# Patient Record
Sex: FEMALE
[De-identification: names, ages, dates, MRNs, and addresses within clinical notes are randomized per-mention and may not be internally consistent; named-entity substitution may affect disease eponyms.]

---

## 2020-12-07 PROBLEM — Z00.00 ENCOUNTER FOR PREVENTIVE HEALTH EXAMINATION: Status: ACTIVE | Noted: 2020-12-07

## 2020-12-11 ENCOUNTER — APPOINTMENT (OUTPATIENT)
Dept: HEART AND VASCULAR | Facility: CLINIC | Age: 50
End: 2020-12-11
Payer: COMMERCIAL

## 2020-12-11 ENCOUNTER — APPOINTMENT (OUTPATIENT)
Dept: HEART AND VASCULAR | Facility: CLINIC | Age: 50
End: 2020-12-11

## 2020-12-11 VITALS
SYSTOLIC BLOOD PRESSURE: 120 MMHG | DIASTOLIC BLOOD PRESSURE: 84 MMHG | TEMPERATURE: 98.2 F | HEIGHT: 65 IN | HEART RATE: 105 BPM | BODY MASS INDEX: 35.32 KG/M2 | WEIGHT: 212 LBS | OXYGEN SATURATION: 97 %

## 2020-12-11 DIAGNOSIS — R06.02 SHORTNESS OF BREATH: ICD-10-CM

## 2020-12-11 DIAGNOSIS — Z78.9 OTHER SPECIFIED HEALTH STATUS: ICD-10-CM

## 2020-12-11 DIAGNOSIS — R94.31 ABNORMAL ELECTROCARDIOGRAM [ECG] [EKG]: ICD-10-CM

## 2020-12-11 DIAGNOSIS — Z84.1 FAMILY HISTORY OF DISORDERS OF KIDNEY AND URETER: ICD-10-CM

## 2020-12-11 PROCEDURE — 93306 TTE W/DOPPLER COMPLETE: CPT

## 2020-12-11 PROCEDURE — 99204 OFFICE O/P NEW MOD 45 MIN: CPT

## 2020-12-11 PROCEDURE — 99072 ADDL SUPL MATRL&STAF TM PHE: CPT

## 2020-12-11 NOTE — PHYSICAL EXAM
[General Appearance - Well Developed] : well developed [Normal Appearance] : normal appearance [Well Groomed] : well groomed [General Appearance - Well Nourished] : well nourished [No Deformities] : no deformities [General Appearance - In No Acute Distress] : no acute distress [Normal Conjunctiva] : the conjunctiva exhibited no abnormalities [Eyelids - No Xanthelasma] : the eyelids demonstrated no xanthelasmas [Normal Oral Mucosa] : normal oral mucosa [No Oral Pallor] : no oral pallor [No Oral Cyanosis] : no oral cyanosis [Normal Jugular Venous A Waves Present] : normal jugular venous A waves present [Normal Jugular Venous V Waves Present] : normal jugular venous V waves present [No Jugular Venous Rollins A Waves] : no jugular venous rollins A waves [Respiration, Rhythm And Depth] : normal respiratory rhythm and effort [Exaggerated Use Of Accessory Muscles For Inspiration] : no accessory muscle use [Auscultation Breath Sounds / Voice Sounds] : lungs were clear to auscultation bilaterally [Heart Rate And Rhythm] : heart rate and rhythm were normal [Heart Sounds] : normal S1 and S2 [Murmurs] : no murmurs present [Abdomen Soft] : soft [Abdomen Tenderness] : non-tender [Abdomen Mass (___ Cm)] : no abdominal mass palpated [Abnormal Walk] : normal gait [Gait - Sufficient For Exercise Testing] : the gait was sufficient for exercise testing [Nail Clubbing] : no clubbing of the fingernails [Cyanosis, Localized] : no localized cyanosis [Petechial Hemorrhages (___cm)] : no petechial hemorrhages [] : no ischemic changes [Oriented To Time, Place, And Person] : oriented to person, place, and time [Affect] : the affect was normal [Mood] : the mood was normal [No Anxiety] : not feeling anxious

## 2020-12-11 NOTE — HISTORY OF PRESENT ILLNESS
[FreeTextEntry1] : for cardiac evaluation priort o starting new job\par told ecg abnormal, needs clearance\par good functional capacity, no limitations to activity

## 2024-07-24 ENCOUNTER — NON-APPOINTMENT (OUTPATIENT)
Age: 54
End: 2024-07-24

## 2024-07-25 ENCOUNTER — APPOINTMENT (OUTPATIENT)
Dept: ENDOCRINOLOGY | Facility: CLINIC | Age: 54
End: 2024-07-25
Payer: COMMERCIAL

## 2024-07-25 VITALS
BODY MASS INDEX: 34.32 KG/M2 | SYSTOLIC BLOOD PRESSURE: 147 MMHG | HEART RATE: 112 BPM | DIASTOLIC BLOOD PRESSURE: 92 MMHG | WEIGHT: 206 LBS | HEIGHT: 65 IN

## 2024-07-25 DIAGNOSIS — E66.9 OBESITY, UNSPECIFIED: ICD-10-CM

## 2024-07-25 DIAGNOSIS — R63.5 ABNORMAL WEIGHT GAIN: ICD-10-CM

## 2024-07-25 DIAGNOSIS — R73.03 PREDIABETES.: ICD-10-CM

## 2024-07-25 PROCEDURE — 99204 OFFICE O/P NEW MOD 45 MIN: CPT

## 2024-07-25 NOTE — PHYSICAL EXAM
[Alert] : alert [Obese] : obese [No Acute Distress] : no acute distress [Normal Voice/Communication] : normal voice communication [Normal Hearing] : hearing was normal [No Respiratory Distress] : no respiratory distress [Normal Rate and Effort] : normal respiratory rate and effort [Clear to Auscultation] : lungs were clear to auscultation bilaterally [Normal Rate] : heart rate was normal [Regular Rhythm] : with a regular rhythm [Normal Gait] : normal gait [Oriented x3] : oriented to person, place, and time [Normal Affect] : the affect was normal [Normal Insight/Judgement] : insight and judgment were intact [Normal Mood] : the mood was normal

## 2024-07-25 NOTE — ASSESSMENT
[FreeTextEntry1] : 1. Obesity Obesity, class I Weight related comorbidities of prediabetes Current weight 206 with BMI 34.28 Highest weight is current weight LEIDA has made dietary attempts, as well as exercise attempts for weight loss with limited benefit Counseling provided regarding obesity management with emphasis on continued increasing exercise regimen, diet modification, as well as pharmacologic management.  Reviewed oral and injectable therapy options today with their benefits (inclusive of weight loss expectations: orlistat ~3-4% at 1 year; Qsymia ~6.6-8.6% at 1 year, >70% achieve this; Contrave 4.8% at 1 year  <50% achieve this and Saxenda ~50-60% achieve >5% loss at 1 year; wegovy 83.5% achieve >5% loss at 1 year, 66.1% >10% and 47.9% >15%, Zepbound >20% ), risks and administration. Counseling provided re: GLP1 agonist regimen, inclusive of dosing, injection administration, mechanism if action (increases glucose-dependent insulin secretion, decreases inappropriate glucagon secretion, slows gastric emptying, and decreases food intake), benefits (cardiovascular, renal, weight loss promotion, A1C reduction), possible side effects (commonly GI: nausea/vomiting, diarrhea, constipation; headache, fatigue dizziness, injection site reactions and rare: pancreatitis).  Discussed contraindication if personal/fhx of MEN or medullary thyroid cancer and rationale of this contraindication Denies fhx/personal h/o MEN or medullary thyroid cancer. Apprehensive for injectable therapy, but with demonstration of injection, believes that she could self administer with autoinjector of wegovy or zepbound. -- start wegovy 0.25mg/weekly, then continue titration scale, which was reviewed today with patient, pending auth -- referral for nutrition counseling -- continue to increase physical activity  2. Prediabetes A1C 5.9% (5/17/24) Follow up in 3-4 months  Madai Kovacs MS, [facility], [facility] 07/25/2024

## 2024-07-25 NOTE — HISTORY OF PRESENT ILLNESS
[FreeTextEntry1] : Ms. LEIDA MANDUJANO  is a 53 year-year old female with pmhx of obesity, prediabetes who presents for evaluation.  Referred by PCP  The patient has been overweight or obese since ~2-3 years. Difficulty losing weight in the past year.  Weight trend: Weight gain over past 2-3 years, prior to weight would be up and down. The patient's current weight is 206lb with BMI of 34.28  Taking no medications or supplements at this time.  Complications related to obesity: None (diabetes, prediabetes, metabolic syndrome, NAFLD, JANAE, dyslipidemia, HTN, osteoarthritis, depression) + fhx of diabetes (PGM)  The patient has tried the following in attempts to lose weight:  - diet modifications: soda limitation - increased physical activity - tries to obtain 8H sleep  Current diet:  No chips, snacks  Consumes 2-3 meals daily Beverages: tea with lemon with 2 sugars or just lemon, limiting soda  Current exercise:  - walking, currently ~6K steps daily  That patient has not met with nutritionist to discuss lifestyle changes.  She is interested in meeting with nutritonist  No past medical management to promote weight loss. No past surgical management to promote weight loss.

## 2024-07-26 RX ORDER — SEMAGLUTIDE 1 MG/.5ML
1 INJECTION, SOLUTION SUBCUTANEOUS
Qty: 1 | Refills: 0 | Status: ACTIVE | COMMUNITY
Start: 2024-07-26 | End: 1900-01-01

## 2024-07-26 RX ORDER — SEMAGLUTIDE 0.25 MG/.5ML
0.25 INJECTION, SOLUTION SUBCUTANEOUS
Qty: 1 | Refills: 0 | Status: ACTIVE | COMMUNITY
Start: 2024-07-25 | End: 1900-01-01

## 2024-07-26 RX ORDER — SEMAGLUTIDE 0.5 MG/.5ML
0.5 INJECTION, SOLUTION SUBCUTANEOUS
Qty: 1 | Refills: 0 | Status: ACTIVE | COMMUNITY
Start: 2024-07-26 | End: 1900-01-01

## 2024-07-26 RX ORDER — SEMAGLUTIDE 1.7 MG/.75ML
1.7 INJECTION, SOLUTION SUBCUTANEOUS
Qty: 6 | Refills: 1 | Status: ACTIVE | COMMUNITY
Start: 2024-07-26 | End: 1900-01-01

## 2024-09-26 ENCOUNTER — APPOINTMENT (OUTPATIENT)
Dept: ENDOCRINOLOGY | Facility: CLINIC | Age: 54
End: 2024-09-26

## 2024-11-21 ENCOUNTER — APPOINTMENT (OUTPATIENT)
Dept: ENDOCRINOLOGY | Facility: CLINIC | Age: 54
End: 2024-11-21
Payer: COMMERCIAL

## 2024-11-21 VITALS
SYSTOLIC BLOOD PRESSURE: 144 MMHG | WEIGHT: 210 LBS | BODY MASS INDEX: 34.95 KG/M2 | HEART RATE: 110 BPM | DIASTOLIC BLOOD PRESSURE: 96 MMHG

## 2024-11-21 DIAGNOSIS — E66.9 OBESITY, UNSPECIFIED: ICD-10-CM

## 2024-11-21 DIAGNOSIS — R73.03 PREDIABETES.: ICD-10-CM

## 2024-11-21 PROCEDURE — 99213 OFFICE O/P EST LOW 20 MIN: CPT

## 2025-01-04 ENCOUNTER — NON-APPOINTMENT (OUTPATIENT)
Age: 55
End: 2025-01-04

## 2025-02-06 ENCOUNTER — APPOINTMENT (OUTPATIENT)
Dept: ENDOCRINOLOGY | Facility: CLINIC | Age: 55
End: 2025-02-06
Payer: COMMERCIAL

## 2025-02-06 VITALS
SYSTOLIC BLOOD PRESSURE: 141 MMHG | DIASTOLIC BLOOD PRESSURE: 84 MMHG | WEIGHT: 209 LBS | HEART RATE: 106 BPM | BODY MASS INDEX: 34.78 KG/M2

## 2025-02-06 DIAGNOSIS — E66.9 OBESITY, UNSPECIFIED: ICD-10-CM

## 2025-02-06 DIAGNOSIS — R73.03 PREDIABETES.: ICD-10-CM

## 2025-02-06 LAB — GLUCOSE BLDC GLUCOMTR-MCNC: 99

## 2025-02-06 PROCEDURE — 99213 OFFICE O/P EST LOW 20 MIN: CPT

## 2025-02-06 PROCEDURE — 82962 GLUCOSE BLOOD TEST: CPT

## 2025-05-01 ENCOUNTER — APPOINTMENT (OUTPATIENT)
Dept: ENDOCRINOLOGY | Facility: CLINIC | Age: 55
End: 2025-05-01
Payer: COMMERCIAL

## 2025-05-01 VITALS
DIASTOLIC BLOOD PRESSURE: 87 MMHG | SYSTOLIC BLOOD PRESSURE: 135 MMHG | WEIGHT: 198 LBS | HEART RATE: 98 BPM | BODY MASS INDEX: 32.95 KG/M2

## 2025-05-01 DIAGNOSIS — E66.9 OBESITY, UNSPECIFIED: ICD-10-CM

## 2025-05-01 PROCEDURE — 99213 OFFICE O/P EST LOW 20 MIN: CPT

## 2025-09-08 ENCOUNTER — NON-APPOINTMENT (OUTPATIENT)
Age: 55
End: 2025-09-08